# Patient Record
Sex: MALE | Employment: FULL TIME | URBAN - METROPOLITAN AREA
[De-identification: names, ages, dates, MRNs, and addresses within clinical notes are randomized per-mention and may not be internally consistent; named-entity substitution may affect disease eponyms.]

---

## 2022-07-26 ENCOUNTER — LAB (OUTPATIENT)
Dept: LAB | Facility: CLINIC | Age: 27
End: 2022-07-26
Payer: COMMERCIAL

## 2022-07-26 DIAGNOSIS — Z79.899 NEED FOR PROPHYLACTIC CHEMOTHERAPY: ICD-10-CM

## 2022-07-26 DIAGNOSIS — K75.4 CHRONIC AGGRESSIVE HEPATITIS (H): Primary | ICD-10-CM

## 2022-07-26 LAB
BASOPHILS # BLD AUTO: 0 10E3/UL (ref 0–0.2)
BASOPHILS NFR BLD AUTO: 1 %
EOSINOPHIL # BLD AUTO: 0.1 10E3/UL (ref 0–0.7)
EOSINOPHIL NFR BLD AUTO: 1 %
ERYTHROCYTE [DISTWIDTH] IN BLOOD BY AUTOMATED COUNT: 13.2 % (ref 10–15)
HCT VFR BLD AUTO: 39.4 % (ref 40–53)
HGB BLD-MCNC: 13.8 G/DL (ref 13.3–17.7)
IMM GRANULOCYTES # BLD: 0 10E3/UL
IMM GRANULOCYTES NFR BLD: 0 %
LYMPHOCYTES # BLD AUTO: 1 10E3/UL (ref 0.8–5.3)
LYMPHOCYTES NFR BLD AUTO: 26 %
MCH RBC QN AUTO: 33.5 PG (ref 26.5–33)
MCHC RBC AUTO-ENTMCNC: 35 G/DL (ref 31.5–36.5)
MCV RBC AUTO: 96 FL (ref 78–100)
MONOCYTES # BLD AUTO: 0.5 10E3/UL (ref 0–1.3)
MONOCYTES NFR BLD AUTO: 13 %
NEUTROPHILS # BLD AUTO: 2.1 10E3/UL (ref 1.6–8.3)
NEUTROPHILS NFR BLD AUTO: 58 %
PLATELET # BLD AUTO: 240 10E3/UL (ref 150–450)
RBC # BLD AUTO: 4.12 10E6/UL (ref 4.4–5.9)
WBC # BLD AUTO: 3.6 10E3/UL (ref 4–11)

## 2022-07-26 PROCEDURE — 80076 HEPATIC FUNCTION PANEL: CPT

## 2022-07-26 PROCEDURE — 36415 COLL VENOUS BLD VENIPUNCTURE: CPT

## 2022-07-26 PROCEDURE — 85025 COMPLETE CBC W/AUTO DIFF WBC: CPT

## 2022-07-27 LAB
ALBUMIN SERPL-MCNC: 4.3 G/DL (ref 3.4–5)
ALP SERPL-CCNC: 52 U/L (ref 40–150)
ALT SERPL W P-5'-P-CCNC: 45 U/L (ref 0–70)
AST SERPL W P-5'-P-CCNC: 23 U/L (ref 0–45)
BILIRUB DIRECT SERPL-MCNC: 0.2 MG/DL (ref 0–0.2)
BILIRUB SERPL-MCNC: 1.1 MG/DL (ref 0.2–1.3)
PROT SERPL-MCNC: 8.1 G/DL (ref 6.8–8.8)

## 2022-08-19 ENCOUNTER — LAB (OUTPATIENT)
Dept: LAB | Facility: CLINIC | Age: 27
End: 2022-08-19
Payer: COMMERCIAL

## 2022-08-19 DIAGNOSIS — K75.4 HEPATITIS, AUTOIMMUNE (H): Primary | ICD-10-CM

## 2022-08-19 DIAGNOSIS — Z79.899 MEDICATION MANAGEMENT: ICD-10-CM

## 2022-08-19 LAB
BASOPHILS # BLD AUTO: 0 10E3/UL (ref 0–0.2)
BASOPHILS NFR BLD AUTO: 0 %
EOSINOPHIL # BLD AUTO: 0 10E3/UL (ref 0–0.7)
EOSINOPHIL NFR BLD AUTO: 1 %
ERYTHROCYTE [DISTWIDTH] IN BLOOD BY AUTOMATED COUNT: 12.8 % (ref 10–15)
HCT VFR BLD AUTO: 40.4 % (ref 40–53)
HGB BLD-MCNC: 14 G/DL (ref 13.3–17.7)
IMM GRANULOCYTES # BLD: 0 10E3/UL
IMM GRANULOCYTES NFR BLD: 0 %
LYMPHOCYTES # BLD AUTO: 0.9 10E3/UL (ref 0.8–5.3)
LYMPHOCYTES NFR BLD AUTO: 28 %
MCH RBC QN AUTO: 33.2 PG (ref 26.5–33)
MCHC RBC AUTO-ENTMCNC: 34.7 G/DL (ref 31.5–36.5)
MCV RBC AUTO: 96 FL (ref 78–100)
MONOCYTES # BLD AUTO: 0.4 10E3/UL (ref 0–1.3)
MONOCYTES NFR BLD AUTO: 12 %
NEUTROPHILS # BLD AUTO: 1.9 10E3/UL (ref 1.6–8.3)
NEUTROPHILS NFR BLD AUTO: 58 %
PLATELET # BLD AUTO: 211 10E3/UL (ref 150–450)
RBC # BLD AUTO: 4.22 10E6/UL (ref 4.4–5.9)
WBC # BLD AUTO: 3.2 10E3/UL (ref 4–11)

## 2022-08-19 PROCEDURE — 80076 HEPATIC FUNCTION PANEL: CPT

## 2022-08-19 PROCEDURE — 85025 COMPLETE CBC W/AUTO DIFF WBC: CPT

## 2022-08-19 PROCEDURE — 36415 COLL VENOUS BLD VENIPUNCTURE: CPT

## 2022-08-20 LAB
ALBUMIN SERPL-MCNC: 4.5 G/DL (ref 3.4–5)
ALP SERPL-CCNC: 56 U/L (ref 40–150)
ALT SERPL W P-5'-P-CCNC: 126 U/L (ref 0–70)
AST SERPL W P-5'-P-CCNC: 38 U/L (ref 0–45)
BILIRUB DIRECT SERPL-MCNC: 0.4 MG/DL (ref 0–0.2)
BILIRUB SERPL-MCNC: 1.5 MG/DL (ref 0.2–1.3)
PROT SERPL-MCNC: 8.5 G/DL (ref 6.8–8.8)

## 2022-10-03 ENCOUNTER — HEALTH MAINTENANCE LETTER (OUTPATIENT)
Age: 27
End: 2022-10-03

## 2023-03-27 ENCOUNTER — OFFICE VISIT (OUTPATIENT)
Dept: URGENT CARE | Facility: URGENT CARE | Age: 28
End: 2023-03-27
Payer: COMMERCIAL

## 2023-03-27 VITALS
WEIGHT: 190 LBS | OXYGEN SATURATION: 98 % | SYSTOLIC BLOOD PRESSURE: 130 MMHG | BODY MASS INDEX: 24.38 KG/M2 | RESPIRATION RATE: 16 BRPM | DIASTOLIC BLOOD PRESSURE: 72 MMHG | HEART RATE: 80 BPM | HEIGHT: 74 IN | TEMPERATURE: 97.9 F

## 2023-03-27 DIAGNOSIS — R07.0 THROAT PAIN: Primary | ICD-10-CM

## 2023-03-27 LAB
DEPRECATED S PYO AG THROAT QL EIA: NEGATIVE
GROUP A STREP BY PCR: NOT DETECTED

## 2023-03-27 PROCEDURE — 99202 OFFICE O/P NEW SF 15 MIN: CPT | Performed by: PHYSICIAN ASSISTANT

## 2023-03-27 PROCEDURE — 87651 STREP A DNA AMP PROBE: CPT | Performed by: PHYSICIAN ASSISTANT

## 2023-03-27 RX ORDER — AZATHIOPRINE 50 MG/1
200 TABLET ORAL DAILY
COMMUNITY
End: 2024-08-26

## 2023-03-27 NOTE — PROGRESS NOTES
"SUBJECTIVE:   Reggie Evans is a 27 year old male who complains of moderate sore throat and myalgias for 1 days. He denies a history of non-productive cough, wheezing, shortness of breath and vomiting and denies a history of asthma. Patient denies smoke cigarettes. He did not get a flu shot this year     OBJECTIVE:    /72   Pulse 80   Temp 97.9  F (36.6  C) (Temporal)   Resp 16   Ht 1.88 m (6' 2\")   Wt 86.2 kg (190 lb)   SpO2 98%   BMI 24.39 kg/m      He appears well, vital signs are as noted by the nurse. Ears normal.  Throat and pharynx mild erythema no exudates  Neck supple. No adenopathy in the neck. . The chest is clear, without wheezes or rales. CVS exam: S1, S2 normal, no murmur, click, rub or gallop, regular rate and rhythm.     ASSESSMENT:    Diagnosis Comments   1. Throat pain  Streptococcus A Rapid Screen w/Reflex to PCR - Clinic Collect, Group A Streptococcus PCR Throat Swab             PLAN:  He mentioned he will test for covid at home  Does not want a flu test  Strep culture pending  Symptomatic therapy suggested: push fluids and rest. Call or return to clinic prn if these symptoms worsen or fail to improve as anticipated.    "

## 2023-03-27 NOTE — PATIENT INSTRUCTIONS
Results for orders placed or performed in visit on 03/27/23   Streptococcus A Rapid Screen w/Reflex to PCR - Clinic Collect     Status: Normal    Specimen: Throat; Swab   Result Value Ref Range    Group A Strep antigen Negative Negative

## 2023-05-26 ENCOUNTER — LAB (OUTPATIENT)
Dept: LAB | Facility: CLINIC | Age: 28
End: 2023-05-26
Payer: COMMERCIAL

## 2023-05-26 DIAGNOSIS — K75.4 AUTOIMMUNE HEPATITIS (H): Primary | ICD-10-CM

## 2023-05-26 LAB
ALBUMIN SERPL-MCNC: 4.5 G/DL (ref 3.5–5)
ALP SERPL-CCNC: 43 U/L (ref 45–120)
ALT SERPL W P-5'-P-CCNC: 35 U/L (ref 0–45)
ANION GAP SERPL CALCULATED.3IONS-SCNC: 8 MMOL/L (ref 5–18)
AST SERPL W P-5'-P-CCNC: 19 U/L (ref 0–40)
BASOPHILS # BLD AUTO: 0 10E3/UL (ref 0–0.2)
BASOPHILS NFR BLD AUTO: 1 %
BILIRUB DIRECT SERPL-MCNC: 0.4 MG/DL
BILIRUB SERPL-MCNC: 1.4 MG/DL (ref 0–1)
BUN SERPL-MCNC: 13 MG/DL (ref 8–22)
CALCIUM SERPL-MCNC: 9.8 MG/DL (ref 8.5–10.5)
CHLORIDE BLD-SCNC: 102 MMOL/L (ref 98–107)
CO2 SERPL-SCNC: 29 MMOL/L (ref 22–31)
CREAT SERPL-MCNC: 1 MG/DL (ref 0.7–1.3)
EOSINOPHIL # BLD AUTO: 0 10E3/UL (ref 0–0.7)
EOSINOPHIL NFR BLD AUTO: 1 %
ERYTHROCYTE [DISTWIDTH] IN BLOOD BY AUTOMATED COUNT: 13.4 % (ref 10–15)
GFR SERPL CREATININE-BSD FRML MDRD: >90 ML/MIN/1.73M2
GGT SERPL-CCNC: 22 U/L (ref 8–61)
GLUCOSE BLD-MCNC: 90 MG/DL (ref 70–125)
HCT VFR BLD AUTO: 37.7 % (ref 40–53)
HGB BLD-MCNC: 13.5 G/DL (ref 13.3–17.7)
IMM GRANULOCYTES # BLD: 0 10E3/UL
IMM GRANULOCYTES NFR BLD: 0 %
INR PPP: 1.07 (ref 0.85–1.15)
LDH SERPL L TO P-CCNC: 140 U/L (ref 125–220)
LYMPHOCYTES # BLD AUTO: 1 10E3/UL (ref 0.8–5.3)
LYMPHOCYTES NFR BLD AUTO: 29 %
MCH RBC QN AUTO: 33.2 PG (ref 26.5–33)
MCHC RBC AUTO-ENTMCNC: 35.8 G/DL (ref 31.5–36.5)
MCV RBC AUTO: 93 FL (ref 78–100)
MONOCYTES # BLD AUTO: 0.4 10E3/UL (ref 0–1.3)
MONOCYTES NFR BLD AUTO: 12 %
NEUTROPHILS # BLD AUTO: 1.9 10E3/UL (ref 1.6–8.3)
NEUTROPHILS NFR BLD AUTO: 57 %
NRBC # BLD AUTO: 0 10E3/UL
NRBC BLD AUTO-RTO: 0 /100
PLATELET # BLD AUTO: 229 10E3/UL (ref 150–450)
POTASSIUM BLD-SCNC: 4.1 MMOL/L (ref 3.5–5)
PROT SERPL-MCNC: 8 G/DL (ref 6–8)
RBC # BLD AUTO: 4.07 10E6/UL (ref 4.4–5.9)
SODIUM SERPL-SCNC: 139 MMOL/L (ref 136–145)
WBC # BLD AUTO: 3.4 10E3/UL (ref 4–11)

## 2023-05-26 PROCEDURE — 36415 COLL VENOUS BLD VENIPUNCTURE: CPT

## 2023-05-26 PROCEDURE — 82977 ASSAY OF GGT: CPT

## 2023-05-26 PROCEDURE — 80053 COMPREHEN METABOLIC PANEL: CPT

## 2023-05-26 PROCEDURE — 85025 COMPLETE CBC W/AUTO DIFF WBC: CPT

## 2023-05-26 PROCEDURE — 83615 LACTATE (LD) (LDH) ENZYME: CPT

## 2023-05-26 PROCEDURE — 82248 BILIRUBIN DIRECT: CPT

## 2023-05-26 PROCEDURE — 85610 PROTHROMBIN TIME: CPT

## 2023-10-22 ENCOUNTER — HEALTH MAINTENANCE LETTER (OUTPATIENT)
Age: 28
End: 2023-10-22

## 2024-01-17 ENCOUNTER — MEDICAL CORRESPONDENCE (OUTPATIENT)
Dept: HEALTH INFORMATION MANAGEMENT | Facility: CLINIC | Age: 29
End: 2024-01-17
Payer: COMMERCIAL

## 2024-01-18 ENCOUNTER — TRANSCRIBE ORDERS (OUTPATIENT)
Dept: OTHER | Age: 29
End: 2024-01-18

## 2024-01-18 DIAGNOSIS — K75.4 AUTOIMMUNE HEPATITIS (H): Primary | ICD-10-CM

## 2024-01-19 ENCOUNTER — TELEPHONE (OUTPATIENT)
Dept: GASTROENTEROLOGY | Facility: CLINIC | Age: 29
End: 2024-01-19
Payer: COMMERCIAL

## 2024-03-08 NOTE — CONFIDENTIAL NOTE
DIAGNOSIS:  Autoimmune hepatitis    Appt Date:  04.30.2024     NOTES STATUS DETAILS   OFFICE NOTE from referring provider Received / Care Everywhere 01.19.2024  Juan Castaneda MD   [facility] clinic    OFFICE NOTES from other specialists Care Everywhere 12.30.2021 Jim Soto   Healthcare   DISCHARGE SUMMARY from hospital     MEDICATION LIST Care Everywhere    LIVER BIOSPY (IF APPLICABLE)      PATHOLOGY REPORTS      IMAGING     ENDOSCOPY (IF AVAILABLE)     COLONOSCOPY (IF AVAILABLE)     ULTRASOUND LIVER     CT OF ABDOMEN     MRI OF LIVER     FIBROSCAN, US ELASTOGRAPHY, FIBROSIS SCAN, MR ELASTOGRAPHY     LABS     HEPATIC PANEL (LIVER PANEL) Care Everywhere 10.19.2023   BASIC METABOLIC PANEL Care Everywhere 10.19.2023   COMPLETE METABOLIC PANEL Care Everywhere 10.19.2023   COMPLETE BLOOD COUNT (CBC) Care Everywhere 10.19.2023   INTERNATIONAL NORMALIZED RATIO (INR) Care Everywhere 10.19.2023   HEPATITIS C ANTIBODY     HEPATITIS C VIRAL LOAD/PCR     HEPATITIS C GENOTYPE     HEPATITIS B SURFACE ANTIGEN Care Everywhere 05.14.2021   HEPATITIS B SURFACE ANTIBODY Care Everywhere 05.14.2021   HEPATITIS B DNA QUANT LEVEL     HEPATITIS B CORE ANTIBODY Care Everywhere 05.14.2021

## 2024-04-30 ENCOUNTER — PRE VISIT (OUTPATIENT)
Dept: GASTROENTEROLOGY | Facility: CLINIC | Age: 29
End: 2024-04-30
Payer: COMMERCIAL

## 2024-04-30 ENCOUNTER — OFFICE VISIT (OUTPATIENT)
Dept: GASTROENTEROLOGY | Facility: CLINIC | Age: 29
End: 2024-04-30
Attending: INTERNAL MEDICINE
Payer: COMMERCIAL

## 2024-04-30 ENCOUNTER — LAB (OUTPATIENT)
Dept: LAB | Facility: CLINIC | Age: 29
End: 2024-04-30
Payer: COMMERCIAL

## 2024-04-30 VITALS
WEIGHT: 193.8 LBS | SYSTOLIC BLOOD PRESSURE: 133 MMHG | HEART RATE: 77 BPM | DIASTOLIC BLOOD PRESSURE: 86 MMHG | OXYGEN SATURATION: 100 % | BODY MASS INDEX: 24.88 KG/M2 | TEMPERATURE: 97.9 F

## 2024-04-30 DIAGNOSIS — K75.4 AUTOIMMUNE HEPATITIS (H): ICD-10-CM

## 2024-04-30 LAB
ALBUMIN SERPL BCG-MCNC: 4.8 G/DL (ref 3.5–5.2)
ALP SERPL-CCNC: 45 U/L (ref 40–150)
ALT SERPL W P-5'-P-CCNC: 39 U/L (ref 0–70)
ANION GAP SERPL CALCULATED.3IONS-SCNC: 11 MMOL/L (ref 7–15)
AST SERPL W P-5'-P-CCNC: 29 U/L (ref 0–45)
BILIRUB DIRECT SERPL-MCNC: 0.29 MG/DL (ref 0–0.3)
BILIRUB SERPL-MCNC: 1.4 MG/DL
BUN SERPL-MCNC: 15.8 MG/DL (ref 6–20)
CALCIUM SERPL-MCNC: 9.9 MG/DL (ref 8.6–10)
CHLORIDE SERPL-SCNC: 100 MMOL/L (ref 98–107)
CREAT SERPL-MCNC: 0.95 MG/DL (ref 0.67–1.17)
DEPRECATED HCO3 PLAS-SCNC: 25 MMOL/L (ref 22–29)
EGFRCR SERPLBLD CKD-EPI 2021: >90 ML/MIN/1.73M2
ERYTHROCYTE [DISTWIDTH] IN BLOOD BY AUTOMATED COUNT: 12.8 % (ref 10–15)
GLUCOSE SERPL-MCNC: 100 MG/DL (ref 70–99)
HCT VFR BLD AUTO: 41.9 % (ref 40–53)
HGB BLD-MCNC: 14.5 G/DL (ref 13.3–17.7)
INR PPP: 1.07 (ref 0.85–1.15)
MCH RBC QN AUTO: 33 PG (ref 26.5–33)
MCHC RBC AUTO-ENTMCNC: 34.6 G/DL (ref 31.5–36.5)
MCV RBC AUTO: 95 FL (ref 78–100)
PLATELET # BLD AUTO: 252 10E3/UL (ref 150–450)
POTASSIUM SERPL-SCNC: 4.2 MMOL/L (ref 3.4–5.3)
PROT SERPL-MCNC: 8.3 G/DL (ref 6.4–8.3)
RBC # BLD AUTO: 4.4 10E6/UL (ref 4.4–5.9)
SODIUM SERPL-SCNC: 136 MMOL/L (ref 135–145)
WBC # BLD AUTO: 3.8 10E3/UL (ref 4–11)

## 2024-04-30 PROCEDURE — 82248 BILIRUBIN DIRECT: CPT

## 2024-04-30 PROCEDURE — 80053 COMPREHEN METABOLIC PANEL: CPT

## 2024-04-30 PROCEDURE — 99214 OFFICE O/P EST MOD 30 MIN: CPT | Performed by: INTERNAL MEDICINE

## 2024-04-30 PROCEDURE — 36415 COLL VENOUS BLD VENIPUNCTURE: CPT

## 2024-04-30 PROCEDURE — 85610 PROTHROMBIN TIME: CPT

## 2024-04-30 PROCEDURE — 85027 COMPLETE CBC AUTOMATED: CPT

## 2024-04-30 PROCEDURE — 99204 OFFICE O/P NEW MOD 45 MIN: CPT | Performed by: INTERNAL MEDICINE

## 2024-04-30 PROCEDURE — 82784 ASSAY IGA/IGD/IGG/IGM EACH: CPT

## 2024-04-30 ASSESSMENT — PAIN SCALES - GENERAL: PAINLEVEL: NO PAIN (0)

## 2024-04-30 NOTE — NURSING NOTE
Chief Complaint   Patient presents with    New Patient       /86   Pulse 77   Temp 97.9  F (36.6  C) (Oral)   Wt 87.9 kg (193 lb 12.8 oz)   SpO2 100%   BMI 24.88 kg/m      Buddy Malave on 4/30/2024 at 8:26 AM

## 2024-04-30 NOTE — PROGRESS NOTES
[facility] Hepatology    New Patient Visit    Referring provider:  Juan Castaneda    28 year old male    Chief complaint:  Autoimmune hepatitis    HPI:  AIH  - dx Feb 2015  - BRIGHT pos, F-actin Ab pos  - liver bx 3/2/2015- chronic hepatitis, plasma cells, stage 2/4 fibrosis  - liver bx 4/27/2021- minimal inflammation, mild steatosis, no significant fibrosis  - AZA since Sep 2015    Patient comes to clinic this morning to establish care for autoimmune hepatitis.  He was previously seen at the [facility].    Patient was diagnosed with autoimmune hepatitis in February 2015 when he presented with jaundice.  At that time, patient was taking minocycline.  Liver biopsy was consistent with autoimmune hepatitis and BRIGHT was strongly positive.  Patient was started on azathioprine and prednisone.  Patient experienced a flare in his autoimmune hepatitis in April 2021.  Patient is currently taking azathioprine 200 mg/day and budesonide 3 mg/day.    Patient is well today.  He has no symptoms related to liver disease.    Patient denies jaundice, abdominal distension, lower extremity edema, lethargy or confusion.    No history of melena, hematemesis or hematochezia.    Patient denies fevers, sweats or chills.  Patient received his influenza vaccination this winter.  He has not received follow-up COVID and 19 vaccination.    No history of weight loss or gain.  Appetite is normal.    Patient has 2 beers every other week.  He denies any history of AUD or DUIs.    Patient has never smoked.  He denies any recreational or illicit drug use including marijuana, IN or IV drugs.    Medical hx Surgical hx   Past Medical History:   Diagnosis Date    Autoimmune hepatitis (H) 02/2015    Hashimoto's thyroiditis 05/2015    euthyroid      Past Surgical History:   Procedure Laterality Date    ADENOIDECTOMY      HAND SURGERY Left     WISDOM TOOTH EXTRACTION            Medications  Current Outpatient Medications   Medication Sig Dispense  Refill    azaTHIOprine (IMURAN) 50 MG tablet Take 200 mg by mouth daily      BUDESONIDE ER PO Take 3 mg by mouth daily         Allergies  Allergies   Allergen Reactions    Minocycline        Family hx Social hx   Family History   Problem Relation Age of Onset    Multiple Sclerosis Mother     Coronary Artery Disease Maternal Grandfather     Diabetes Paternal Grandmother     Diabetes Paternal Aunt     Liver Disease No family hx of     Lupus No family hx of     Celiac Disease No family hx of     Inflammatory Bowel Disease No family hx of     Rheumatoid Arthritis No family hx of       Social History     Tobacco Use    Smoking status: Never    Smokeless tobacco: Never   Substance Use Topics    Alcohol use: Yes     Alcohol/week: 1.0 standard drink of alcohol     Types: 1 Cans of beer per week    Drug use: Never     Patient lives in [place] with his girlfriend.  He does not have any children.  He works as an  for [employer] in [employer].  Patient has 3 sisters who are healthy.     Review of systems  A 10-point review of systems was negative.    Examination  /86   Pulse 77   Temp 97.9  F (36.6  C) (Oral)   Wt 87.9 kg (193 lb 12.8 oz)   SpO2 100%   BMI 24.88 kg/m    Body mass index is 24.88 kg/m .    Gen- well, NAD, A+Ox3, normal color  Eye- EOMI  ENT- MMM, normal oropharynx  Lym- no palpable lymphadenopathy  CVS- S1, S2 normal, no added sounds, RRR  RS- CTA  Abd- soft, non-tender, no ascites or organomegaly on palpation or percussion, BS+  Extr- pulses good, no YANI  MS- hands normal- no clubbing  Neuro- A+Ox3, no asterixis  Skin- no rash or jaundice  Psych- normal mood    Laboratory  Lab Results   Component Value Date     05/26/2023    POTASSIUM 4.1 05/26/2023    CHLORIDE 102 05/26/2023    CO2 29 05/26/2023    BUN 13 05/26/2023    CR 1.00 05/26/2023       Lab Results   Component Value Date    BILITOTAL 1.4 05/26/2023    ALT 35 05/26/2023    AST 19 05/26/2023    ALKPHOS 43 05/26/2023        Lab Results   Component Value Date    ALBUMIN 4.5 05/26/2023    PROTTOTAL 8.0 05/26/2023        Lab Results   Component Value Date    WBC 3.4 05/26/2023    HGB 13.5 05/26/2023    MCV 93 05/26/2023     05/26/2023       Lab Results   Component Value Date    INR 1.07 05/26/2023         Radiology  Nil recent    Assessment  28 year old male who presents to establish care for autoimmune hepatitis.  Liver function tests in May 2023 normal.  No evidence of cirrhosis on most recent liver biopsy.  Azathioprine is currently dosed at more than 2 g/kg.  Will repeat blood work today including IgG.  If liver function tests are normal, will aim to taper off budesonide and ultimately reduce dose of azathioprine.    We reviewed the pathophysiology, complications and management of autoimmune hepatitis.  We also reviewed side effects and risks of therapy for autoimmune hepatitis.    Plan  Check CMP, INR, CBC, IgG  Continue azathioprine 200mg PO for now  Continue budesonide 3mg PO Q24 for now  Follow-up with me in 6 months    Trae Brito MD  Hepatology  [facility]

## 2024-04-30 NOTE — PATIENT INSTRUCTIONS
Plan  Check blood work today  If blood work is normal, we will aim to taper down the number and doses of your AIH meds  Follow-up with me in 6 months    Trae Brito MD  Hepatology

## 2024-05-01 LAB — IGG SERPL-MCNC: 1664 MG/DL (ref 610–1616)

## 2024-05-02 DIAGNOSIS — K75.4 AUTOIMMUNE HEPATITIS (H): Primary | ICD-10-CM

## 2024-06-17 ENCOUNTER — LAB (OUTPATIENT)
Dept: LAB | Facility: CLINIC | Age: 29
End: 2024-06-17
Payer: COMMERCIAL

## 2024-06-17 DIAGNOSIS — K75.4 AUTOIMMUNE HEPATITIS (H): ICD-10-CM

## 2024-06-17 LAB
ALBUMIN SERPL BCG-MCNC: 4.6 G/DL (ref 3.5–5.2)
ALP SERPL-CCNC: 43 U/L (ref 40–150)
ALT SERPL W P-5'-P-CCNC: 45 U/L (ref 0–70)
AST SERPL W P-5'-P-CCNC: 28 U/L (ref 0–45)
BILIRUB DIRECT SERPL-MCNC: 0.24 MG/DL (ref 0–0.3)
BILIRUB SERPL-MCNC: 1.1 MG/DL
PROT SERPL-MCNC: 7.9 G/DL (ref 6.4–8.3)

## 2024-06-17 PROCEDURE — 36415 COLL VENOUS BLD VENIPUNCTURE: CPT

## 2024-06-17 PROCEDURE — 82784 ASSAY IGA/IGD/IGG/IGM EACH: CPT

## 2024-06-17 PROCEDURE — 80076 HEPATIC FUNCTION PANEL: CPT

## 2024-06-18 LAB — IGG SERPL-MCNC: 1602 MG/DL (ref 610–1616)

## 2024-08-25 ENCOUNTER — MYC MEDICAL ADVICE (OUTPATIENT)
Dept: GASTROENTEROLOGY | Facility: CLINIC | Age: 29
End: 2024-08-25
Payer: COMMERCIAL

## 2024-08-25 DIAGNOSIS — K75.4 AUTOIMMUNE HEPATITIS (H): Primary | ICD-10-CM

## 2024-08-26 ENCOUNTER — LAB (OUTPATIENT)
Dept: LAB | Facility: CLINIC | Age: 29
End: 2024-08-26
Payer: COMMERCIAL

## 2024-08-26 DIAGNOSIS — K75.4 AUTOIMMUNE HEPATITIS (H): ICD-10-CM

## 2024-08-26 LAB
ALBUMIN SERPL BCG-MCNC: 4.6 G/DL (ref 3.5–5.2)
ALP SERPL-CCNC: 44 U/L (ref 40–150)
ALT SERPL W P-5'-P-CCNC: 43 U/L (ref 0–70)
AST SERPL W P-5'-P-CCNC: 38 U/L (ref 0–45)
BILIRUB DIRECT SERPL-MCNC: 0.28 MG/DL (ref 0–0.3)
BILIRUB SERPL-MCNC: 1.2 MG/DL
PROT SERPL-MCNC: 7.6 G/DL (ref 6.4–8.3)

## 2024-08-26 PROCEDURE — 36415 COLL VENOUS BLD VENIPUNCTURE: CPT

## 2024-08-26 PROCEDURE — 80076 HEPATIC FUNCTION PANEL: CPT

## 2024-08-26 PROCEDURE — 82784 ASSAY IGA/IGD/IGG/IGM EACH: CPT

## 2024-08-26 RX ORDER — AZATHIOPRINE 50 MG/1
200 TABLET ORAL DAILY
Qty: 120 TABLET | Refills: 0 | Status: SHIPPED | OUTPATIENT
Start: 2024-08-26 | End: 2024-08-28

## 2024-08-27 LAB — IGG SERPL-MCNC: 1585 MG/DL (ref 610–1616)

## 2024-08-28 DIAGNOSIS — K75.4 AUTOIMMUNE HEPATITIS (H): ICD-10-CM

## 2024-08-28 RX ORDER — AZATHIOPRINE 50 MG/1
150 TABLET ORAL EVERY 24 HOURS
COMMUNITY
Start: 2024-08-28 | End: 2024-10-03

## 2024-10-03 DIAGNOSIS — K75.4 AUTOIMMUNE HEPATITIS (H): ICD-10-CM

## 2024-10-03 RX ORDER — AZATHIOPRINE 50 MG/1
200 TABLET ORAL EVERY 24 HOURS
Qty: 120 TABLET | Refills: 5 | Status: SHIPPED | OUTPATIENT
Start: 2024-10-03 | End: 2024-10-03

## 2024-10-03 RX ORDER — AZATHIOPRINE 50 MG/1
150 TABLET ORAL EVERY 24 HOURS
Qty: 90 TABLET | Refills: 5 | Status: SHIPPED | OUTPATIENT
Start: 2024-10-03

## 2024-12-15 ENCOUNTER — HEALTH MAINTENANCE LETTER (OUTPATIENT)
Age: 29
End: 2024-12-15

## 2025-03-31 ENCOUNTER — LAB (OUTPATIENT)
Dept: LAB | Facility: CLINIC | Age: 30
End: 2025-03-31
Payer: COMMERCIAL

## 2025-03-31 ENCOUNTER — OFFICE VISIT (OUTPATIENT)
Dept: GASTROENTEROLOGY | Facility: CLINIC | Age: 30
End: 2025-03-31
Attending: INTERNAL MEDICINE
Payer: COMMERCIAL

## 2025-03-31 VITALS
SYSTOLIC BLOOD PRESSURE: 138 MMHG | WEIGHT: 191.3 LBS | BODY MASS INDEX: 24.56 KG/M2 | TEMPERATURE: 98.1 F | HEART RATE: 68 BPM | DIASTOLIC BLOOD PRESSURE: 86 MMHG | OXYGEN SATURATION: 97 %

## 2025-03-31 DIAGNOSIS — K75.4 AUTOIMMUNE HEPATITIS (H): ICD-10-CM

## 2025-03-31 DIAGNOSIS — K75.4 AUTOIMMUNE HEPATITIS (H): Primary | ICD-10-CM

## 2025-03-31 LAB
ALBUMIN SERPL BCG-MCNC: 4.7 G/DL (ref 3.5–5.2)
ALP SERPL-CCNC: 46 U/L (ref 40–150)
ALT SERPL W P-5'-P-CCNC: 20 U/L (ref 0–70)
AST SERPL W P-5'-P-CCNC: 19 U/L (ref 0–45)
BILIRUB DIRECT SERPL-MCNC: 0.37 MG/DL (ref 0–0.3)
BILIRUB SERPL-MCNC: 1 MG/DL
ERYTHROCYTE [DISTWIDTH] IN BLOOD BY AUTOMATED COUNT: 12.8 % (ref 10–15)
HCT VFR BLD AUTO: 39.3 % (ref 40–53)
HGB BLD-MCNC: 13.8 G/DL (ref 13.3–17.7)
MCH RBC QN AUTO: 32.2 PG (ref 26.5–33)
MCHC RBC AUTO-ENTMCNC: 35.1 G/DL (ref 31.5–36.5)
MCV RBC AUTO: 92 FL (ref 78–100)
PLATELET # BLD AUTO: 207 10E3/UL (ref 150–450)
PROT SERPL-MCNC: 7.9 G/DL (ref 6.4–8.3)
RBC # BLD AUTO: 4.28 10E6/UL (ref 4.4–5.9)
WBC # BLD AUTO: 4.6 10E3/UL (ref 4–11)

## 2025-03-31 PROCEDURE — 36415 COLL VENOUS BLD VENIPUNCTURE: CPT | Performed by: PATHOLOGY

## 2025-03-31 PROCEDURE — 85027 COMPLETE CBC AUTOMATED: CPT | Performed by: PATHOLOGY

## 2025-03-31 PROCEDURE — 99213 OFFICE O/P EST LOW 20 MIN: CPT | Performed by: INTERNAL MEDICINE

## 2025-03-31 PROCEDURE — 99000 SPECIMEN HANDLING OFFICE-LAB: CPT | Performed by: PATHOLOGY

## 2025-03-31 PROCEDURE — 99215 OFFICE O/P EST HI 40 MIN: CPT | Performed by: INTERNAL MEDICINE

## 2025-03-31 PROCEDURE — 82784 ASSAY IGA/IGD/IGG/IGM EACH: CPT | Performed by: INTERNAL MEDICINE

## 2025-03-31 PROCEDURE — 3079F DIAST BP 80-89 MM HG: CPT | Performed by: INTERNAL MEDICINE

## 2025-03-31 PROCEDURE — 80076 HEPATIC FUNCTION PANEL: CPT | Performed by: PATHOLOGY

## 2025-03-31 PROCEDURE — 1126F AMNT PAIN NOTED NONE PRSNT: CPT | Performed by: INTERNAL MEDICINE

## 2025-03-31 PROCEDURE — 3075F SYST BP GE 130 - 139MM HG: CPT | Performed by: INTERNAL MEDICINE

## 2025-03-31 ASSESSMENT — PAIN SCALES - GENERAL: PAINLEVEL_OUTOF10: NO PAIN (0)

## 2025-03-31 NOTE — NURSING NOTE
Chief Complaint   Patient presents with    RECHECK       /86   Pulse 68   Temp 98.1  F (36.7  C) (Oral)   Wt 86.8 kg (191 lb 4.8 oz)   SpO2 97%   BMI 24.56 kg/m      Buddy Malave on 3/31/2025 at 2:18 PM

## 2025-03-31 NOTE — LETTER
3/31/2025      Reggie Evans  [address] [address]      Dear Colleague,    Thank you for referring your patient, Reggie Evans, to the [facility] [place]. Please see a copy of my visit note below.    Hepatology Follow-Up Visit:     HISTORY OF PRESENT ILLNESS:   I had the pleasure of seeing Reggie Evans for followup in the Liver Clinic at the [facility] on March 31, 2025. 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    He was first diagnosed back 10 years ago, originally in a pediatrics clinic.  He has been treated with azathioprine and recently on budesonide alone he is now off budesonide.  He reports he experienced a flare fairly early on in the course of his treatment and another flare in 2021.  I did review that flare and his transaminases were very mildly elevated and interestingly he had a liver biopsy at that time which was essentially normal.    At present, he denies any abdominal pain.  He does note some mild itching on his legs when he showers.  He denies any fatigue.  He denies any increased abdominal girth or lower extremity edema.  He has not had any gastrointestinal bleeding.  He denies any fevers or chills, cough or shortness of breath.  He denies any nausea or vomiting, diarrhea or constipation.  His appetite has been good and his weight for the most part has been stable.    Medications:   Current Outpatient Medications   Medication Sig Dispense Refill     azaTHIOprine (IMURAN) 50 MG tablet Take 3 tablets (150 mg) by mouth every 24 hours. 90 tablet 0     BUDESONIDE ER PO Take 3 mg by mouth daily       No current facility-administered medications for this visit.      Vitals:   /86   Pulse 68   Temp 98.1  F (36.7  C) (Oral)   Wt 86.8 kg (191 lb 4.8 oz)   SpO2 97%   BMI 24.56 kg/m      Physical Exam:   In general he looks quite well. HEENT exam shows no scleral icterus  or temporal muscle wasting. Chest is clear. Abdominal exam shows no increase in girth. No masses or tenderness to palpation are present. Liver is 10 cm in span without left lobe enlargement. No spleen tip is palpable. Extremity exam shows no edema. Skin exam shows no stigmata of chronic liver disease. Neurologic exam shows no asterixis.     Labs:   Lab Results   Component Value Date     04/30/2024    POTASSIUM 4.2 04/30/2024    CHLORIDE 100 04/30/2024    ANIONGAP 11 04/30/2024    CO2 25 04/30/2024    BUN 15.8 04/30/2024    CR 0.95 04/30/2024    GFRESTIMATED >90 04/30/2024    CHAO 9.9 04/30/2024      Lab Results   Component Value Date    WBC 3.8 (L) 04/30/2024    HGB 14.5 04/30/2024    HCT 41.9 04/30/2024    MCV 95 04/30/2024    MCH 33.0 04/30/2024    MCHC 34.6 04/30/2024    RDW 12.8 04/30/2024     04/30/2024     Lab Results   Component Value Date    ALBUMIN 4.7 03/31/2025    ALKPHOS 46 03/31/2025    AST 19 03/31/2025     Lab Results   Component Value Date    INR 1.07 04/30/2024     MELD 3.0: 9 at 4/30/2024 12:21 PM  MELD-Na: 9 at 4/30/2024 12:21 PM  Calculated from:  Serum Creatinine: 0.95 mg/dL (Using min of 1 mg/dL) at 4/30/2024 12:21 PM  Serum Sodium: 136 mmol/L at 4/30/2024 12:21 PM  Total Bilirubin: 1.4 mg/dL at 4/30/2024 12:21 PM  Serum Albumin: 4.8 g/dL (Using max of 3.5 g/dL) at 4/30/2024 12:21 PM  INR(ratio): 1.07 at 4/30/2024 12:21 PM  Age at listing (hypothetical): 28 years  Sex: Male at 4/30/2024 12:21 PM    Imaging:   No images are attached to the encounter.     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  I am going to lower his azathioprine to 100 mg daily given how normal his liver tests are.  He will go down and get a CBC performed as that was not completed today.  I will put blood test send to get a CBC and liver panel every 3 months and I will see him back in the clinic in 6 months.  I am going to try to wean him  completely off azathioprine as I do not think I would count the last rise in his liver tests as a flare given that his liver biopsy was normal.    I did spend total of 40 minutes (on the date of the encounter), including 30 minutes of face-to-face clinic time including counseling. The rest of the time was spent in documentation and review of records.     Thank you very much for allowing me to participate in the care of this patient.  If you have any questions regarding my recommendations, please do not hesitate to contact me.      Sincerely,       Robby Dangelo MD      Professor of Medicine  [employer]      Executive Medical Director, Solid Organ Transplant Program  [facility]        Again, thank you for allowing me to participate in the care of your patient.        Sincerely,        Robby Dangelo MD    Electronically signed

## 2025-04-01 LAB — IGG SERPL-MCNC: 1477 MG/DL (ref 610–1616)

## 2025-05-13 ENCOUNTER — PATIENT OUTREACH (OUTPATIENT)
Dept: CARE COORDINATION | Facility: CLINIC | Age: 30
End: 2025-05-13
Payer: COMMERCIAL

## 2025-05-15 ENCOUNTER — PATIENT OUTREACH (OUTPATIENT)
Dept: CARE COORDINATION | Facility: CLINIC | Age: 30
End: 2025-05-15
Payer: COMMERCIAL

## 2025-05-18 ENCOUNTER — MYC REFILL (OUTPATIENT)
Dept: GASTROENTEROLOGY | Facility: CLINIC | Age: 30
End: 2025-05-18
Payer: COMMERCIAL

## 2025-05-18 DIAGNOSIS — K75.4 AUTOIMMUNE HEPATITIS (H): ICD-10-CM

## 2025-05-19 RX ORDER — AZATHIOPRINE 50 MG/1
100 TABLET ORAL EVERY 24 HOURS
Qty: 180 TABLET | Refills: 3 | Status: SHIPPED | OUTPATIENT
Start: 2025-05-19

## 2025-07-07 ENCOUNTER — OFFICE VISIT (OUTPATIENT)
Dept: FAMILY MEDICINE | Facility: CLINIC | Age: 30
End: 2025-07-07
Payer: COMMERCIAL

## 2025-07-07 VITALS
WEIGHT: 194.6 LBS | TEMPERATURE: 98.2 F | RESPIRATION RATE: 14 BRPM | BODY MASS INDEX: 24.97 KG/M2 | OXYGEN SATURATION: 97 % | DIASTOLIC BLOOD PRESSURE: 77 MMHG | HEIGHT: 74 IN | HEART RATE: 74 BPM | SYSTOLIC BLOOD PRESSURE: 125 MMHG

## 2025-07-07 DIAGNOSIS — H92.02 LEFT EAR PAIN: ICD-10-CM

## 2025-07-07 DIAGNOSIS — H72.92 PERFORATION OF LEFT TYMPANIC MEMBRANE: Primary | ICD-10-CM

## 2025-07-07 PROCEDURE — 3074F SYST BP LT 130 MM HG: CPT | Performed by: FAMILY MEDICINE

## 2025-07-07 PROCEDURE — 99213 OFFICE O/P EST LOW 20 MIN: CPT | Performed by: FAMILY MEDICINE

## 2025-07-07 PROCEDURE — 1125F AMNT PAIN NOTED PAIN PRSNT: CPT | Performed by: FAMILY MEDICINE

## 2025-07-07 PROCEDURE — 3078F DIAST BP <80 MM HG: CPT | Performed by: FAMILY MEDICINE

## 2025-07-07 RX ORDER — OFLOXACIN 3 MG/ML
5 SOLUTION AURICULAR (OTIC) DAILY
Qty: 10 ML | Refills: 0 | Status: SHIPPED | OUTPATIENT
Start: 2025-07-07 | End: 2025-07-12

## 2025-07-07 ASSESSMENT — PAIN SCALES - GENERAL: PAINLEVEL_OUTOF10: MILD PAIN (2)

## 2025-07-07 NOTE — PATIENT INSTRUCTIONS
Use ear drops  Avoid water   Followup in 1-2 weeks to recheck ears  Antihistamine and decongestants   Take care  Lesley Wilkes D.O.

## 2025-07-07 NOTE — PROGRESS NOTES
"  Assessment & Plan       ICD-10-CM    1. Perforation of left tympanic membrane  H72.92 ofloxacin (FLOXIN) 0.3 % otic solution      2. Left ear pain  H92.02 ofloxacin (FLOXIN) 0.3 % otic solution        Small area on left ear drum, no redness , no drainage, advised antibiotics drops, and follow up in 1-2 weeks for recheck of ears  Consider antihistamine and nasal spray if having pressure/plugged up feeling    Avoid the wateer          Follow-up       Subjective   Reggie is a 29 year old, presenting for the following health issues:  Ear Problem (Left ear/)        7/7/2025     1:22 PM   Additional Questions   Roomed by Josemanuel GALEANO MA   Accompanied by Self     Ear Problem    History of Present Illness       Reason for visit:  Ear  Symptom onset:  1-3 days ago   He is taking medications regularly.      Left ear drum, sharp pain, Friday, [place] the dock, must have hit the water hard, muffled sounds.  Fiancee and other nurses looked in ear and it looked ruptured. Initially, took ibuprofen and sudafed.   It has been improving last coupled days and now it feels worse again.   As a kid had ear tubes.     Supposed to be flight on the 07/18/25. Getting  in August, then honeymoon.         Review of Systems  Constitutional, HEENT, cardiovascular, pulmonary, GI, , musculoskeletal, neuro, skin, endocrine and psych systems are negative, except as otherwise noted.      Objective    /77 (BP Location: Right arm, Patient Position: Chair, Cuff Size: Adult Large)   Pulse 74   Temp 98.2  F (36.8  C) (Oral)   Resp 14   Ht 1.88 m (6' 2.02\")   Wt 88.3 kg (194 lb 9.6 oz)   SpO2 97%   BMI 24.97 kg/m    Body mass index is 24.97 kg/m .  Physical Exam   GENERAL: alert and no distress  EYES: Eyes grossly normal to inspection, PERRL and conjunctivae and sclerae normal  HENT: ear canals and left TM's superiorly at 11 o'clock dark small deficit, no redness, no drainage, right ear looks ok, nose and mouth without ulcers or " lesions  NECK: no adenopathy, no asymmetry, masses, or scars  RESP: lungs clear to auscultation - no rales, rhonchi or wheezes  MS: no gross musculoskeletal defects noted, no edema           Signed Electronically by: Lesley Wilkes,